# Patient Record
Sex: MALE | Race: WHITE | Employment: FULL TIME | URBAN - METROPOLITAN AREA
[De-identification: names, ages, dates, MRNs, and addresses within clinical notes are randomized per-mention and may not be internally consistent; named-entity substitution may affect disease eponyms.]

---

## 2017-04-18 ENCOUNTER — CLINICAL SUPPORT (OUTPATIENT)
Dept: OPHTHALMOLOGY | Facility: CLINIC | Age: 52
End: 2017-04-18
Payer: COMMERCIAL

## 2017-04-18 ENCOUNTER — OFFICE VISIT (OUTPATIENT)
Dept: OPTOMETRY | Facility: CLINIC | Age: 52
End: 2017-04-18
Payer: COMMERCIAL

## 2017-04-18 DIAGNOSIS — H21.233 PIGMENT DISPERSION SYNDROME, BILATERAL: Primary | ICD-10-CM

## 2017-04-18 DIAGNOSIS — H40.053 OCULAR HYPERTENSION, BILATERAL: ICD-10-CM

## 2017-04-18 PROCEDURE — 92133 CPTRZD OPH DX IMG PST SGM ON: CPT | Mod: S$GLB,,, | Performed by: OPTOMETRIST

## 2017-04-18 PROCEDURE — 92020 GONIOSCOPY: CPT | Mod: S$GLB,,, | Performed by: OPTOMETRIST

## 2017-04-18 PROCEDURE — 92015 DETERMINE REFRACTIVE STATE: CPT | Mod: S$GLB,,, | Performed by: OPTOMETRIST

## 2017-04-18 PROCEDURE — 92014 COMPRE OPH EXAM EST PT 1/>: CPT | Mod: S$GLB,,, | Performed by: OPTOMETRIST

## 2017-04-18 PROCEDURE — 92083 EXTENDED VISUAL FIELD XM: CPT | Mod: S$GLB,,, | Performed by: OPTOMETRIST

## 2017-04-18 PROCEDURE — 99999 PR PBB SHADOW E&M-EST. PATIENT-LVL II: CPT | Mod: PBBFAC,,, | Performed by: OPTOMETRIST

## 2017-04-18 NOTE — PROGRESS NOTES
HPI     DLS: 7/18/2016  Pt states va has decreased all distances. Wear +1.75 otc readers.  Occasional floaters. Denies flashes of light     Xalatan ou qpm        Last edited by Anum Pozo on 4/18/2017  8:19 AM.     ROS     Positive for: Skin, Eyes    Negative for: Constitutional, Gastrointestinal, Neurological,   Genitourinary, Musculoskeletal, HENT, Endocrine, Cardiovascular,   Respiratory, Psychiatric, Allergic/Imm, Heme/Lymph    Last edited by Terence Wong, OD on 4/18/2017  9:37 AM. (History)        Assessment /Plan     For exam results, see Encounter Report.    Pigment dispersion syndrome, bilateral  -     Willingham Visual Field - OU - Extended - Both Eyes  -     Posterior Segment OCT Optic Nerve- Both eyes    Ocular hypertension, bilateral  -     Willingham Visual Field - OU - Extended - Both Eyes  -     Posterior Segment OCT Optic Nerve- Both eyes        1. Pig disp/OHT OU--iop wnl on XAL OU (was on milka ou/alpha OD in past). Mod cupping--see OCT (outside normal OD w thin inf RNFL, borderline OS w thin inf RNFL).  VF today wnl OU.   -fam hx. Pach:599/582.  Angles open by gonio  to SS with gr 3 pig on TM OU  2. Pt happy w otc readers when needed  3. ROGERS--pt to cont w ATs          Plan:    1. Cont XAL qhs OU  2. rtc 3 mos--iop ck.  Next  HVF 24-2 sf/OCT/full April 2018

## 2017-09-29 ENCOUNTER — OFFICE VISIT (OUTPATIENT)
Dept: OPTOMETRY | Facility: CLINIC | Age: 52
End: 2017-09-29
Payer: COMMERCIAL

## 2017-09-29 DIAGNOSIS — H21.233 PIGMENT DISPERSION SYNDROME, BILATERAL: Primary | ICD-10-CM

## 2017-09-29 DIAGNOSIS — H40.053 OCULAR HYPERTENSION, BILATERAL: ICD-10-CM

## 2017-09-29 PROCEDURE — 99999 PR PBB SHADOW E&M-EST. PATIENT-LVL II: CPT | Mod: PBBFAC,,, | Performed by: OPTOMETRIST

## 2017-09-29 PROCEDURE — 92012 INTRM OPH EXAM EST PATIENT: CPT | Mod: S$GLB,,, | Performed by: OPTOMETRIST

## 2017-09-29 RX ORDER — METHOCARBAMOL 750 MG/1
TABLET, FILM COATED ORAL
Refills: 0 | COMMUNITY
Start: 2017-08-01 | End: 2018-05-14

## 2017-09-29 RX ORDER — FLUOCINONIDE 0.5 MG/G
OINTMENT TOPICAL
Refills: 0 | COMMUNITY
Start: 2017-09-09 | End: 2017-09-29 | Stop reason: SDUPTHER

## 2017-09-29 RX ORDER — IBUPROFEN 800 MG/1
TABLET ORAL
Refills: 0 | COMMUNITY
Start: 2017-07-24 | End: 2018-05-14

## 2017-09-29 NOTE — PROGRESS NOTES
HPI     DLS:4/18/2017  Pt states va has decreased all distances. Wear +1.75 otc readers.  Occasional floaters. Denies flashes of light     Xalatan ou qpm     Pigment dispersion syndrome ou   Hx OHT OU --see prev notes    Last edited by Terence Wong, OD on 9/29/2017  8:22 AM. (History)        ROS     Positive for: Eyes (pig disp/OHT OU)    Negative for: Constitutional, Gastrointestinal, Neurological, Skin,   Genitourinary, Musculoskeletal, HENT, Endocrine, Cardiovascular,   Respiratory, Psychiatric, Allergic/Imm, Heme/Lymph    Last edited by Terence Wong, OD on 9/29/2017  8:22 AM. (History)        Assessment /Plan     For exam results, see Encounter Report.    Pigment dispersion syndrome, bilateral    Ocular hypertension, bilateral        1. Pig disp/OHT OU--iop wnl on XAL OU (was on milka ou/alpha OD in past). Mod cupping--see OCT (outside normal OD w thin inf RNFL, borderline OS w thin inf RNFL).  Last VF wnl OU.   -fam hx. Pach:599/582.  Angles open by gonio  to SS with gr 3 pig on TM OU  2. Pt happy w otc readers when needed  3. ROGERS--pt to cont w ATs          Plan:    1. Cont XAL qhs OU  2. rtc 3 mos--iop ck.  Next  HVF 24-2 sf/OCT/full April 2018

## 2018-02-05 ENCOUNTER — OFFICE VISIT (OUTPATIENT)
Dept: OPTOMETRY | Facility: CLINIC | Age: 53
End: 2018-02-05
Payer: COMMERCIAL

## 2018-02-05 DIAGNOSIS — H40.053 OCULAR HYPERTENSION, BILATERAL: ICD-10-CM

## 2018-02-05 DIAGNOSIS — H21.233 PIGMENT DISPERSION SYNDROME, BILATERAL: Primary | ICD-10-CM

## 2018-02-05 PROCEDURE — 92012 INTRM OPH EXAM EST PATIENT: CPT | Mod: S$GLB,,, | Performed by: OPTOMETRIST

## 2018-02-05 PROCEDURE — 99999 PR PBB SHADOW E&M-EST. PATIENT-LVL II: CPT | Mod: PBBFAC,,, | Performed by: OPTOMETRIST

## 2018-02-05 RX ORDER — PANTOPRAZOLE SODIUM 40 MG/1
TABLET, DELAYED RELEASE ORAL
Refills: 0 | COMMUNITY
Start: 2017-12-06 | End: 2018-05-14

## 2018-02-05 RX ORDER — FLUOCINONIDE 0.5 MG/G
OINTMENT TOPICAL
Refills: 3 | COMMUNITY
Start: 2017-11-27

## 2018-02-05 RX ORDER — SODIUM, POTASSIUM,MAG SULFATES 17.5-3.13G
SOLUTION, RECONSTITUTED, ORAL ORAL
Refills: 0 | COMMUNITY
Start: 2017-12-11 | End: 2018-05-14

## 2018-02-05 NOTE — PROGRESS NOTES
HPI     DLS: 9/29/2017  Pt here for IOP Check   Pt states no noticeable va changes    XAL OU QHS     Pig Disp/OHTN OU --pt reports faithful w med  ROGERS     Last edited by Terence Wong, OD on 2/5/2018 10:37 AM. (History)        ROS     Positive for: Eyes (pig disp/OHT OU)    Negative for: Constitutional, Gastrointestinal, Neurological, Skin,   Genitourinary, Musculoskeletal, HENT, Endocrine, Cardiovascular,   Respiratory, Psychiatric, Allergic/Imm, Heme/Lymph    Last edited by Terence Wong, OD on 2/5/2018 10:37 AM. (History)        Assessment /Plan     For exam results, see Encounter Report.    Pigment dispersion syndrome, bilateral    Ocular hypertension, bilateral  -     Willingham Visual Field - OU - Extended - Both Eyes; Future; Expected date: 05/05/2018  -     Posterior Segment OCT Optic Nerve- Both eyes; Future; Expected date: 05/05/2018        1. Pig disp/OHT OU--iop wnl on XAL OU (was on milka ou/alpha OD in past). Mod cupping--see OCT (outside normal OD w thin inf RNFL, borderline OS w thin inf RNFL).  Last VF wnl OU.   -fam hx. Pach:599/582.  Angles open by gonio  to SS with gr 3 pig on TM OU  2. Pt happy w otc readers when needed  3. ROGERS--pt to cont w ATs          Plan:    1. Cont XAL qhs OU  2. rtc 3 mos-- HVF 24-2 sf/OCT/full may 2018

## 2018-05-11 DIAGNOSIS — R06.02 SOB (SHORTNESS OF BREATH): Primary | ICD-10-CM

## 2018-05-14 ENCOUNTER — OFFICE VISIT (OUTPATIENT)
Dept: PULMONOLOGY | Facility: CLINIC | Age: 53
End: 2018-05-14
Payer: COMMERCIAL

## 2018-05-14 ENCOUNTER — HOSPITAL ENCOUNTER (OUTPATIENT)
Dept: RADIOLOGY | Facility: HOSPITAL | Age: 53
Discharge: HOME | End: 2018-05-14
Attending: NURSE PRACTITIONER
Payer: COMMERCIAL

## 2018-05-14 ENCOUNTER — HOSPITAL ENCOUNTER (OUTPATIENT)
Dept: PULMONOLOGY | Facility: CLINIC | Age: 53
Discharge: HOME | End: 2018-05-14
Payer: COMMERCIAL

## 2018-05-14 ENCOUNTER — TELEPHONE (OUTPATIENT)
Dept: PULMONOLOGY | Facility: CLINIC | Age: 53
End: 2018-05-14

## 2018-05-14 VITALS
HEART RATE: 69 BPM | BODY MASS INDEX: 33.83 KG/M2 | SYSTOLIC BLOOD PRESSURE: 127 MMHG | DIASTOLIC BLOOD PRESSURE: 76 MMHG | OXYGEN SATURATION: 98 % | WEIGHT: 236.31 LBS | HEIGHT: 70 IN

## 2018-05-14 VITALS — BODY MASS INDEX: 33.83 KG/M2 | HEIGHT: 70 IN | WEIGHT: 236.31 LBS

## 2018-05-14 DIAGNOSIS — R06.02 SOB (SHORTNESS OF BREATH): ICD-10-CM

## 2018-05-14 DIAGNOSIS — J30.89 ALLERGIC RHINITIS DUE TO OTHER ALLERGIC TRIGGER, UNSPECIFIED SEASONALITY: ICD-10-CM

## 2018-05-14 DIAGNOSIS — I49.3 PVC'S (PREMATURE VENTRICULAR CONTRACTIONS): ICD-10-CM

## 2018-05-14 DIAGNOSIS — R06.02 SOB (SHORTNESS OF BREATH): Primary | ICD-10-CM

## 2018-05-14 DIAGNOSIS — R07.89 CHEST TIGHTNESS: ICD-10-CM

## 2018-05-14 PROCEDURE — 94618 PULMONARY STRESS TESTING: CPT | Mod: S$GLB,,, | Performed by: INTERNAL MEDICINE

## 2018-05-14 PROCEDURE — 71046 X-RAY EXAM CHEST 2 VIEWS: CPT | Mod: TC,FY

## 2018-05-14 PROCEDURE — 3008F BODY MASS INDEX DOCD: CPT | Mod: CPTII,S$GLB,, | Performed by: NURSE PRACTITIONER

## 2018-05-14 PROCEDURE — 99204 OFFICE O/P NEW MOD 45 MIN: CPT | Mod: S$GLB,,, | Performed by: NURSE PRACTITIONER

## 2018-05-14 PROCEDURE — 71046 X-RAY EXAM CHEST 2 VIEWS: CPT | Mod: 26,,, | Performed by: RADIOLOGY

## 2018-05-14 PROCEDURE — 99999 PR PBB SHADOW E&M-EST. PATIENT-LVL III: CPT | Mod: PBBFAC,,, | Performed by: NURSE PRACTITIONER

## 2018-05-14 RX ORDER — METOPROLOL SUCCINATE 25 MG/1
TABLET, EXTENDED RELEASE ORAL
Refills: 0 | COMMUNITY
Start: 2018-05-10

## 2018-05-14 RX ORDER — ATORVASTATIN CALCIUM 10 MG/1
TABLET, FILM COATED ORAL
Refills: 0 | COMMUNITY
Start: 2018-04-19

## 2018-05-14 NOTE — PATIENT INSTRUCTIONS
· Refer to Allergy. History of allergies when younger.   · Continue follow up cardiology.  · Continue with

## 2018-05-14 NOTE — PROGRESS NOTES
Subjective:       Patient ID: Josep Cody Jr. is a 52 y.o. male.    Chief Complaint: SOB    HPI  Josep Cody Jr. is a 52 y.o. male who presents today for evaluation of SOB/chest tightness. His medical hx includes HDL and glaucoma. He primarily receives his care from  but is self refer for pulmonary evaluation. He has report from PFT's obtained at  which states normal pepe lung volumes and DLCO. He has no values from actual testing but is willing to obtain and send to us. His lungs were clear on CXR today. He has no previous respiratory disease. He did have a left sided cardiac cath at  5/7/18 due to abnormal stress echo with frequent PVC and chest discomfort. Cath revealed slight enlargement of left ventricle. EF 55%. He was placed on metroprolol. He states he gets SOB with chest tightness which occurs at rest and at random occasions. He does state he has reflux and is being followed by GI from outside facility. He states his cardiologist and PCP both feel his symptoms are GI related. He does state he has allergies with sinus congestion. He use to see allergy long ago when he was younger. He has no hx of lung cancer. No family hx of lung cancer. He is a never smoker. He works in agriculture. He has no fever, chills, chest pain, LE edema, weight loss, hemoptysis, cough or wheezing.     Review of patient's allergies indicates:   Allergen Reactions    Amoxicillin (bulk)      unknown    Fish oil Swelling     Past Medical History:   Diagnosis Date    Glaucoma     Hypercholesterolemia      Past Surgical History:   Procedure Laterality Date    WISDOM TOOTH EXTRACTION       Family History     Problem Relation (Age of Onset)    Glaucoma Mother        Social History Main Topics    Smoking status: Never Smoker    Smokeless tobacco: Never Used    Alcohol use No    Drug use: No    Sexual activity: Not on file       Review of Systems   Constitutional: Negative for fever, weight loss and appetite change.  "  HENT: Positive for postnasal drip and rhinorrhea. Negative for trouble swallowing and congestion.    Eyes: Negative for redness and itching.   Respiratory: Positive for chest tightness and shortness of breath. Negative for cough, hemoptysis, sputum production, wheezing and use of rescue inhaler.    Cardiovascular: Negative for chest pain and leg swelling.   Musculoskeletal: Negative for gait problem.   Gastrointestinal: Negative for acid reflux.   Neurological: Negative for headaches.   Hematological: Negative for adenopathy.   Psychiatric/Behavioral: Negative for confusion.       Objective:       Vitals:    05/14/18 1407   BP: 127/76   Pulse: 69   SpO2: 98%   Weight: 107.2 kg (236 lb 5.3 oz)   Height: 5' 10" (1.778 m)     Physical Exam   Constitutional: He is oriented to person, place, and time. He appears well-developed and well-nourished. He is obese.   HENT:   Head: Normocephalic.   Mouth/Throat: Oropharynx is clear and moist.   Neck: Normal range of motion. Neck supple.   Cardiovascular: Normal rate and intact distal pulses.    Pulmonary/Chest: Normal expansion, symmetric chest wall expansion, effort normal and breath sounds normal. No respiratory distress. He has no wheezes. He has no rhonchi. He has no rales.   Abdominal: Soft. Bowel sounds are normal. There is no guarding.   Musculoskeletal: Normal range of motion. He exhibits no edema.   Neurological: He is alert and oriented to person, place, and time. Gait normal.   Skin: Skin is warm and dry. No rash noted.   Psychiatric: He has a normal mood and affect.   Vitals reviewed.    Personal Diagnostic Review   PFT report from  5/2/18: Normal pepe, lung volumes normal, normal DLCO. Patient will obtain report with values.  CXR reviewed 5/14/18: lungs clear   6mwt reviewed 5/14/18: no desaturation noted during activity. Ambulated 1100ft without stopping/difficulty.   Outside report for cardiac cath reviewed   Outside report for stress test " reviewed  Assessment:          Outpatient Encounter Prescriptions as of 5/14/2018   Medication Sig Dispense Refill    aspirin 81 MG Chew Take 81 mg by mouth once daily.      fluocinonide (LIDEX) 0.05 % ointment SIVA SPARINGLY AA OF HANDS AND BODY BID PRF REDNESS AND SCALING  3    ibuprofen (ADVIL,MOTRIN) 800 MG tablet TK 1 T PO Q 8 H FOR 7 DAYS  0    latanoprost 0.005 % ophthalmic solution INSTILL 1 DROP IN BOTH EYES EVERY EVENING 10 mL 12    methocarbamol (ROBAXIN) 750 MG Tab TK 2 TS PO UP TO QID FOR 4 DAYS PRF BACK PAIN  0    MULTIVIT-IRON-MIN-FOLIC ACID 3,500-18-0.4 UNIT-MG-MG ORAL CHEW Take by mouth once daily.      pantoprazole (PROTONIX) 40 MG tablet TK 1 T PO QD  0    SUPREP BOWEL PREP KIT 17.5-3.13-1.6 gram SolR UTD  0     No facility-administered encounter medications on file as of 5/14/2018.      Problem List Items Addressed This Visit        Other    SOB (shortness of breath) - Primary      Other Visit Diagnoses     PVC's (premature ventricular contractions) noted on outside facility Cath report        Relevant Medications    metoprolol succinate (TOPROL-XL) 25 MG 24 hr tablet    Chest tightness        Allergic rhinitis due to other allergic trigger, unspecified seasonality        Relevant Orders    Ambulatory Referral to Allergy        Plan:       · While patient did not bring values, given normal lung function on report and normal CXR, feel patient's symptoms are not pulmonary related. Curious as to if patient experiencing esoph spasms.   · Referral to Allergy.  · Follow up with GI. May consider upper GI if indicated.    Follow up with PCP  Contact us if any issues or concerns should arise.   Patient verbalized understanding of all information, instruction, education, recommendations provided and agrees with plan of care.

## 2018-05-14 NOTE — PROCEDURES
Josep Cody Jr. is a 52 y.o.  male patient, who presents for a 6 minute walk test ordered by NATE Mondragon.  The diagnosis is Shortness of Breath.  The patient's BMI is 34 kg/m2.  Predicted distance (lower limit of normal) is 449.26 meters.      Test Results:    The test was completed without stopping.   The total time walked was 360 seconds.  During walking, the patient reported:  Lightheadedness. The patient used    during testing.     05/14/2018---------Distance: 487.68 meters (1600 feet)     O2 Sat % Supplemental Oxygen Heart Rate Blood Pressure Angela Scale   Pre-exercise  (Resting) 98 % Room Air 69 bpm 127/76 mmHg 0   During Exercise 98 % Room Air 79 bpm 137/72 mmHg 0.5   Post-exercise  (Recovery) 99 % Room Air  69 bpm   mmHg       Recovery Time: 113 seconds    Performing nurse/tech: MELVIN Paz      PREVIOUS STUDY:   The patient has not had a previous study.      CLINICAL INTERPRETATION:  Six minute walk distance is 487.68 meters (1600 feet) with very, very light dyspnea.  During exercise, there was no significant desaturation while breathing room air.  Both blood pressure and heart rate remained stable with walking.  The patient reported non-pulmonary symptoms during exercise.  No previous study performed.  Based upon age and body mass index, exercise capacity is normal.

## 2018-05-14 NOTE — TELEPHONE ENCOUNTER
Called patient back and patient stated he does not think he has anxiety. Informed patient that I was not dx him with anxiety and that it was just a thought due to no evidence pulmonary disease with regards to SOB/chest tightness at random times. He has normal PFT's and radiographs. Patient was satisfied with encounter.

## 2018-05-14 NOTE — TELEPHONE ENCOUNTER
----- Message from Marychuy León sent at 5/14/2018  3:29 PM CDT -----  Contact: Pt   [phone]  Needs Medical Advice    Who Called: Pt    Symptoms (please be specific):  N/a   How long has patient had these symptoms:  N/a   Pharmacy name and phone # if needed:  N/a  Communication Preference (MyChart response to Pt. (or) Call Back # and timeframe):  Additional Information: Pt requesting to speak with the nurse , no additional information was provided

## 2018-05-16 ENCOUNTER — CLINICAL SUPPORT (OUTPATIENT)
Dept: OPHTHALMOLOGY | Facility: CLINIC | Age: 53
End: 2018-05-16
Payer: COMMERCIAL

## 2018-05-16 ENCOUNTER — OFFICE VISIT (OUTPATIENT)
Dept: OPTOMETRY | Facility: CLINIC | Age: 53
End: 2018-05-16
Payer: COMMERCIAL

## 2018-05-16 ENCOUNTER — TELEPHONE (OUTPATIENT)
Dept: PULMONOLOGY | Facility: CLINIC | Age: 53
End: 2018-05-16

## 2018-05-16 DIAGNOSIS — H40.053 OCULAR HYPERTENSION, BILATERAL: ICD-10-CM

## 2018-05-16 DIAGNOSIS — H21.233 PIGMENT DISPERSION SYNDROME, BILATERAL: Primary | ICD-10-CM

## 2018-05-16 PROCEDURE — 92015 DETERMINE REFRACTIVE STATE: CPT | Mod: S$GLB,,, | Performed by: OPTOMETRIST

## 2018-05-16 PROCEDURE — 92133 CPTRZD OPH DX IMG PST SGM ON: CPT | Mod: S$GLB,,, | Performed by: OPTOMETRIST

## 2018-05-16 PROCEDURE — 92014 COMPRE OPH EXAM EST PT 1/>: CPT | Mod: S$GLB,,, | Performed by: OPTOMETRIST

## 2018-05-16 PROCEDURE — 99999 PR PBB SHADOW E&M-EST. PATIENT-LVL II: CPT | Mod: PBBFAC,,, | Performed by: OPTOMETRIST

## 2018-05-16 PROCEDURE — 92020 GONIOSCOPY: CPT | Mod: S$GLB,,, | Performed by: OPTOMETRIST

## 2018-05-16 PROCEDURE — 92083 EXTENDED VISUAL FIELD XM: CPT | Mod: S$GLB,,, | Performed by: OPTOMETRIST

## 2018-05-16 RX ORDER — PRENATAL VIT 91/IRON/FOLIC/DHA 28-975-200
COMBINATION PACKAGE (EA) ORAL
COMMUNITY

## 2018-05-16 NOTE — TELEPHONE ENCOUNTER
----- Message from Marychuy León sent at 5/16/2018 12:33 PM CDT -----  Patient Returning Call    Who Called:  Pt    Who Left Message for Patient:  Nurse   Does the patient know what this is regarding?:    Communication Preference (Rocael response to Pt. (or) Call Back # and timeframe)  Additional Information: pt returning the nurse call

## 2018-05-16 NOTE — PROGRESS NOTES
HPI     DLS: 2/5/2018  HVF/OCT done today  Pt states no noticeable va changes  Denies f/f    XAL OU QHS     Pig Disp/OHTN OU --pt reports faithful w med  ROGERS     Last edited by Anum Pozo on 5/16/2018  1:43 PM. (History)        ROS     Positive for: Eyes (pig disp/OHT OU)    Negative for: Constitutional, Gastrointestinal, Neurological, Skin,   Genitourinary, Musculoskeletal, HENT, Endocrine, Cardiovascular,   Respiratory, Psychiatric, Allergic/Imm, Heme/Lymph    Last edited by Terence Wong, OD on 5/16/2018  2:34 PM. (History)        Assessment /Plan     For exam results, see Encounter Report.    Pigment dispersion syndrome, bilateral    Ocular hypertension, bilateral  -     Willingham Visual Field - OU - Extended - Both Eyes  -     Posterior Segment OCT Optic Nerve- Both eyes        1. Pig disp/OHT OU--iop wnl on XAL OU (was on milka ou/alpha OD in past). Mod cupping--see OCT (outside normal OD w red sup/Yellow inf, borderline OS w yellow N/I).   VF today wnl OU.   -fam hx. Pach:599/582.  Angles open by gonio  to SS with gr 3 pig on TM OU  2. Pt happy w otc readers when needed  3. ROGERS--pt to cont w ATs          Plan:    1. Cont XAL qhs OU  2. rtc 4 mos--iop ck.  Next HVF 24-2 sf/OCT/full may 2019

## 2018-05-18 ENCOUNTER — OFFICE VISIT (OUTPATIENT)
Dept: ALLERGY | Facility: CLINIC | Age: 53
End: 2018-05-18
Payer: COMMERCIAL

## 2018-05-18 VITALS — OXYGEN SATURATION: 99 % | BODY MASS INDEX: 35.27 KG/M2 | HEIGHT: 69 IN | WEIGHT: 238.13 LBS | HEART RATE: 66 BPM

## 2018-05-18 DIAGNOSIS — R06.02 SOB (SHORTNESS OF BREATH): ICD-10-CM

## 2018-05-18 DIAGNOSIS — J31.0 CHRONIC RHINITIS: ICD-10-CM

## 2018-05-18 DIAGNOSIS — R07.89 CHEST TIGHTNESS: Primary | ICD-10-CM

## 2018-05-18 PROCEDURE — 99204 OFFICE O/P NEW MOD 45 MIN: CPT | Mod: S$GLB,,, | Performed by: ALLERGY & IMMUNOLOGY

## 2018-05-18 PROCEDURE — 3008F BODY MASS INDEX DOCD: CPT | Mod: CPTII,S$GLB,, | Performed by: ALLERGY & IMMUNOLOGY

## 2018-05-18 PROCEDURE — 99999 PR PBB SHADOW E&M-EST. PATIENT-LVL III: CPT | Mod: PBBFAC,,, | Performed by: ALLERGY & IMMUNOLOGY

## 2018-05-18 RX ORDER — CETIRIZINE HYDROCHLORIDE 10 MG/1
10 TABLET ORAL DAILY
COMMUNITY

## 2018-05-18 NOTE — PROGRESS NOTES
Subjective:       Patient ID: Josep Cody Jr. is a 52 y.o. male.    Chief Complaint:  Allergies (pulmonologist referred for allergy - environmental, )      53 yo man presents for new patient evaluation of possible allergies. He states he works for [employer]. 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 Trees were blooming and weeds were very high. He got SOB there. Took several days to clear but did. Then 1 month ago about 4/16 started and has been most day since. 417 went to ER because chest tight, SOB and even pain in jaw. He had cardiac workup negative, CXR normal. He saw cardiologist and had angiogram which showed some enlarged heart and irreg hear beat so is on metoprolol now. He saw Gi and not much found. Saw pulmonary and normal PFT and normal walk test. CXR normal. Did not want to get inhaler because of irreg heart rate. Advised to eval for allergies. He still gets episodes of tightness and SOB. Often occurs in AM. Often occurs at rest. sometimes going outside helps. Lasts for hours. In AM he has some lear phlegm and gets some after eating. At times stuffy and runny nose and uses Flonase prn. He has no prior asthma or eczema. He does have allergy to fish  Causes throat swelling but fine with shellfish. He had reaction with throat swelling after creole tomatoes sandwich 2 years ago so avoids tomatoes now. He has reactions to amoxil dn fish oil. Fine with shellfish.         Environmental History: see history section for home environment  Review of Systems   Constitutional: Negative for activity change, appetite change, chills, fatigue, fever and unexpected weight change.   HENT: Positive for congestion, postnasal drip and rhinorrhea. Negative for ear discharge, ear pain, facial swelling, hearing loss, mouth sores, nosebleeds, sinus pressure, sneezing, sore throat, tinnitus, trouble swallowing and voice change.    Eyes: Negative for discharge, redness,  itching and visual disturbance.   Respiratory: Positive for chest tightness and shortness of breath. Negative for cough and wheezing.    Cardiovascular: Negative for chest pain, palpitations and leg swelling.   Gastrointestinal: Negative for abdominal distention, abdominal pain, constipation, diarrhea, nausea and vomiting.   Genitourinary: Negative for difficulty urinating.   Musculoskeletal: Negative for arthralgias, back pain, joint swelling and myalgias.   Skin: Negative for color change, pallor and rash.   Neurological: Negative for dizziness, tremors, speech difficulty, weakness, light-headedness and headaches.   Hematological: Negative for adenopathy. Does not bruise/bleed easily.   Psychiatric/Behavioral: Negative for agitation, confusion, decreased concentration and sleep disturbance. The patient is not nervous/anxious.         Objective:    Physical Exam   Constitutional: He is oriented to person, place, and time. He appears well-developed and well-nourished. No distress.   HENT:   Head: Normocephalic and atraumatic.   Right Ear: Hearing, tympanic membrane, external ear and ear canal normal.   Left Ear: Hearing, tympanic membrane, external ear and ear canal normal.   Nose: No mucosal edema (pink turbinates), rhinorrhea, sinus tenderness or septal deviation. No epistaxis.   Mouth/Throat: Oropharynx is clear and moist and mucous membranes are normal. No uvula swelling.   Eyes: Conjunctivae are normal. Right eye exhibits no discharge. Left eye exhibits no discharge.   Neck: Normal range of motion. No thyromegaly present.   Cardiovascular: Normal rate, regular rhythm and normal heart sounds.    No murmur heard.  Pulmonary/Chest: Effort normal and breath sounds normal. No respiratory distress. He has no wheezes.   Abdominal: Soft. He exhibits no distension. There is no tenderness.   Musculoskeletal: Normal range of motion. He exhibits no edema.   Lymphadenopathy:     He has no cervical adenopathy.    Neurological: He is alert and oriented to person, place, and time. Coordination normal.   Skin: Skin is warm and dry. No rash noted. No erythema.   Psychiatric: He has a normal mood and affect. His behavior is normal. Judgment and thought content normal.   Nursing note and vitals reviewed.      Laboratory:   Percutaneous Skin Testing: prick skin test inhalants and tomato, #61, 5/18/18: 3+ histamine control and remainder all negative, see flow sheet  Assessment:       1. Chest tightness    2. SOB (shortness of breath)    3. Chronic rhinitis         Plan:       1. No evidence IgE mediated allergy. symptoms more c/w LPR or esophageal spasms, keep appointment for upper GI

## 2018-05-21 ENCOUNTER — TELEPHONE (OUTPATIENT)
Dept: ALLERGY | Facility: CLINIC | Age: 53
End: 2018-05-21

## 2018-05-21 NOTE — TELEPHONE ENCOUNTER
Spoke to pt, he states that about 30 minutes after he left the office he had reactions to skin tests.     I explained that Dr Schwartz may advise Blood tests to confirm and I told him that I would discuss it with her and call him back.      Pt had questions about if his insurance would cover additional testing, Gave pt the Pricing [employer] Contact information.

## 2018-05-21 NOTE — TELEPHONE ENCOUNTER
----- Message from Balbina Diaz MA sent at 5/21/2018  8:19 AM CDT -----  Contact: Self/[phone]  Pt stated that he may have had a delayed reaction to his skin prick test on 5/18/2018. He stated that his back started to break out in whelps/hives 30 minutes after leaving the office. He stated that he is no longer having the reaction but would like to speak with a nurse to make note of it. Please advise.    Thanks

## 2018-05-22 NOTE — TELEPHONE ENCOUNTER
Spoke to pt on 5/21/18 and told him I would call him back on the afternoon of 5/23/18 because he had procedures scheduled 5/22/18 and would not be available. Reminder set in EPIC.

## 2018-05-22 NOTE — TELEPHONE ENCOUNTER
Please let him know delayed reactions are not significant for inhalant allergies. It is usually contact just to the liquid itself and not true allergy to the specific allergen

## 2018-05-23 ENCOUNTER — TELEPHONE (OUTPATIENT)
Dept: PULMONOLOGY | Facility: CLINIC | Age: 53
End: 2018-05-23

## 2018-05-23 NOTE — TELEPHONE ENCOUNTER
Patient saw GI as recommended. He had an EGD. Someone later told him he should have had an esophogram instead. GI told him he needs to see a Pulmonologist. He has an appointment next week with Dr. Pringle. Patient still having issues with SOB and chest tightness and asks that you call him to discuss.

## 2018-05-23 NOTE — TELEPHONE ENCOUNTER
----- Message from Michelle Cantu sent at 5/23/2018  9:49 AM CDT -----  Contact: Pt  Pt is requesting a callback regarding his test results says he was told he should have had an esophagram also    Pt can be reached at [phone]    Thanks

## 2018-05-23 NOTE — TELEPHONE ENCOUNTER
Called patient back and advised to speak to GI MD regarding esophagram. Also advised to keep appt with Dr. Pringle. Patient verbalized understanding and was satisfied with encounter.

## 2018-06-01 ENCOUNTER — OFFICE VISIT (OUTPATIENT)
Dept: PULMONOLOGY | Facility: CLINIC | Age: 53
End: 2018-06-01
Payer: COMMERCIAL

## 2018-06-01 VITALS
BODY MASS INDEX: 35.43 KG/M2 | SYSTOLIC BLOOD PRESSURE: 124 MMHG | HEIGHT: 69 IN | HEART RATE: 72 BPM | OXYGEN SATURATION: 97 % | DIASTOLIC BLOOD PRESSURE: 72 MMHG | WEIGHT: 239.19 LBS

## 2018-06-01 DIAGNOSIS — R06.02 SOB (SHORTNESS OF BREATH): Primary | ICD-10-CM

## 2018-06-01 PROCEDURE — 99999 PR PBB SHADOW E&M-EST. PATIENT-LVL III: CPT | Mod: PBBFAC,,, | Performed by: INTERNAL MEDICINE

## 2018-06-01 PROCEDURE — 99214 OFFICE O/P EST MOD 30 MIN: CPT | Mod: S$GLB,,, | Performed by: INTERNAL MEDICINE

## 2018-06-01 PROCEDURE — 3008F BODY MASS INDEX DOCD: CPT | Mod: CPTII,S$GLB,, | Performed by: INTERNAL MEDICINE

## 2018-06-04 NOTE — PROGRESS NOTES
HISTORY OF PRESENT ILLNESS:  Mr. Cody is a 52-year-old nonsmoker who comes for   help with recent thoracic symptoms.  He gives a somewhat animated history of   shortness of breath, tightness in the chest, and atypical anterior thoracic   pain.  His initial respiratory problems occurred approximately 2-3 months   ago.  At that time, he was inspecting fruit trees and walking through tall   weeds.  He felt short of breath.  This difficulty persisted for a day or so, and   then seemed to resolve.  Subsequently, he has had recurrent problems with the   feeling of chest tightness along with intermittent shortness of breath.  His   respiratory symptoms have been both exertional and nonexertional.  His thoracic   pain does not have features to suggest pleurisy or angina.  He does not know of   any proven history for asthma or allergic diseases.    Mr. Cody has had multiple evaluations here and at [facility] during the past two months.  He has undergone studies with a   cardiologist, a gastroenterologist, and an allergist.  He also was seen by the   nurse practitioner in this clinic.  Studies done by those physicians have not   yielded a specific diagnosis for his symptoms.  He does report that a recent   upper GI x-ray showed evidence for gastroesophageal reflux and a hiatal hernia.    He was recommended to begin a trial of treatment with omeprazole.  He also was   found to have an irregular heartbeat and has been prescribed metoprolol.  The   respiratory symptoms preceded beginning metoprolol.    Mr. Cody is a lifelong nonsmoker.  His family history is of note in that there   are members who have allergic disease.  He also knows of family members who   have chronic obstructive lung disease.  These individuals have all been   long-term smokers.    DATA:  I have reviewed the images from a chest x-ray done earlier this month.    The cardiac silhouette is not enlarged.  There are no pleural  abnormalities.    The lungs appear clear.  There are no previous radiographs available for   comparison.    Pulmonary function studies done in May at [facility] show   normal values for spirometry, lung volumes, and the diffusion capacity.      KINGSTON/MAYCO  dd: 06/02/2018 09:13:13 (CDT)  td: 06/03/2018 00:03:56 (CDT)  Doc ID   #[id number]  Job ID #[id number]    CC:

## 2018-06-13 ENCOUNTER — TELEPHONE (OUTPATIENT)
Dept: PULMONOLOGY | Facility: CLINIC | Age: 53
End: 2018-06-13

## 2018-06-13 RX ORDER — MONTELUKAST SODIUM 10 MG/1
10 TABLET ORAL NIGHTLY
Refills: 4 | COMMUNITY
Start: 2018-06-04

## 2018-06-13 RX ORDER — CLOBETASOL PROPIONATE 0.5 MG/G
OINTMENT TOPICAL
Refills: 1 | COMMUNITY
Start: 2018-05-31

## 2018-09-28 ENCOUNTER — OFFICE VISIT (OUTPATIENT)
Dept: OPTOMETRY | Facility: CLINIC | Age: 53
End: 2018-09-28
Payer: COMMERCIAL

## 2018-09-28 DIAGNOSIS — H21.233 PIGMENT DISPERSION SYNDROME, BILATERAL: ICD-10-CM

## 2018-09-28 DIAGNOSIS — H40.053 OCULAR HYPERTENSION, BILATERAL: Primary | ICD-10-CM

## 2018-09-28 PROCEDURE — 92012 INTRM OPH EXAM EST PATIENT: CPT | Mod: S$GLB,,, | Performed by: OPTOMETRIST

## 2018-09-28 PROCEDURE — 99999 PR PBB SHADOW E&M-EST. PATIENT-LVL II: CPT | Mod: PBBFAC,,, | Performed by: OPTOMETRIST

## 2018-09-28 RX ORDER — IPRATROPIUM BROMIDE 42 UG/1
2 SPRAY, METERED NASAL 4 TIMES DAILY
COMMUNITY

## 2018-09-28 RX ORDER — OMEPRAZOLE 20 MG/1
20 CAPSULE, DELAYED RELEASE ORAL DAILY
COMMUNITY

## 2018-09-28 RX ORDER — ALBUTEROL SULFATE 90 UG/1
2 AEROSOL, METERED RESPIRATORY (INHALATION) EVERY 6 HOURS PRN
COMMUNITY

## 2018-09-28 NOTE — PROGRESS NOTES
HPI     DLS: 5/16/2018  Pt states within the last two months and with using drops near va is not   great. Wear otc +1.75 readers.  Denies f/f    XAL OU QHS     Pig Disp/OHTN OU --pt reports is faithful w med  ROGERS     Last edited by Terence Wong, OD on 9/28/2018  9:16 AM. (History)        ROS     Positive for: Eyes (pig disp/OHT OU)    Negative for: Constitutional, Gastrointestinal, Neurological, Skin,   Genitourinary, Musculoskeletal, HENT, Endocrine, Cardiovascular,   Respiratory, Psychiatric, Allergic/Imm, Heme/Lymph    Last edited by Terence Wong, OD on 9/28/2018  9:16 AM. (History)        Assessment /Plan     For exam results, see Encounter Report.    Ocular hypertension, bilateral    Pigment dispersion syndrome, bilateral          1. Pig disp/OHT OU--iop wnl on XAL OU (was on milka ou/alpha OD in past). Mod cupping--see OCT (outside normal OD w red sup/Yellow inf, borderline OS w yellow N/I).   Last VF wnl OU.   -fam hx. Pach:599/582.  Angles open by gonio  to SS with gr 3 pig on TM OU  2. Pt happy w otc readers when needed--discussed it is NORMAL to need them more as one gets older and more presbyopic  3. ROGERS--pt to cont w ATs          Plan:    1. Cont XAL qhs OU  2. rtc 4 mos--iop ck.  Next HVF 24-2 sf/OCT/full may 2019

## 2019-01-23 ENCOUNTER — TELEPHONE (OUTPATIENT)
Dept: OPTOMETRY | Facility: CLINIC | Age: 54
End: 2019-01-23

## 2019-02-15 ENCOUNTER — OFFICE VISIT (OUTPATIENT)
Dept: OPTOMETRY | Facility: CLINIC | Age: 54
End: 2019-02-15
Payer: COMMERCIAL

## 2019-02-15 DIAGNOSIS — H21.233 PIGMENT DISPERSION SYNDROME, BILATERAL: ICD-10-CM

## 2019-02-15 DIAGNOSIS — H40.053 OCULAR HYPERTENSION, BILATERAL: Primary | ICD-10-CM

## 2019-02-15 PROCEDURE — 99999 PR PBB SHADOW E&M-EST. PATIENT-LVL II: CPT | Mod: PBBFAC,,, | Performed by: OPTOMETRIST

## 2019-02-15 PROCEDURE — 99999 PR PBB SHADOW E&M-EST. PATIENT-LVL II: ICD-10-PCS | Mod: PBBFAC,,, | Performed by: OPTOMETRIST

## 2019-02-15 PROCEDURE — 92012 PR EYE EXAM, EST PATIENT,INTERMED: ICD-10-PCS | Mod: S$GLB,,, | Performed by: OPTOMETRIST

## 2019-02-15 PROCEDURE — 92012 INTRM OPH EXAM EST PATIENT: CPT | Mod: S$GLB,,, | Performed by: OPTOMETRIST

## 2019-02-15 NOTE — PROGRESS NOTES
HPI     Using Xal drops qhs OU--pt reports faithful w meds.  No problems today    Last edited by Terence Wong, OD on 2/15/2019  9:11 AM. (History)        ROS     Positive for: Eyes (pig disp/OHT OU)    Negative for: Constitutional, Gastrointestinal, Neurological, Skin,   Genitourinary, Musculoskeletal, HENT, Endocrine, Cardiovascular,   Respiratory, Psychiatric, Allergic/Imm, Heme/Lymph    Last edited by Terence Wong, OD on 2/15/2019  9:11 AM. (History)        Assessment /Plan     For exam results, see Encounter Report.    Ocular hypertension, bilateral  -     Willingham Visual Field - OU - Extended - Both Eyes; Future  -     Posterior Segment OCT Optic Nerve- Both eyes; Future    Pigment dispersion syndrome, bilateral            1. Pig disp/OHT OU--iop wnl on XAL OU (was on milka ou/alpha OD in past). Mod cupping--see OCT (outside normal OD w red sup/Yellow inf, borderline OS w yellow N/I).   Last VF wnl OU.   -fam hx. Pach:599/582.  Angles open by gonio  to SS with gr 3 pig on TM OU  2. Pt happy w otc readers when needed  3. ROGERS--pt to cont w ATs          Plan:    1. Cont XAL qhs OU  2. rtc 4 mos--HVF 24-2 sf/OCT/full June 2019

## 2019-06-24 ENCOUNTER — CLINICAL SUPPORT (OUTPATIENT)
Dept: OPHTHALMOLOGY | Facility: CLINIC | Age: 54
End: 2019-06-24
Payer: COMMERCIAL

## 2019-06-24 ENCOUNTER — OFFICE VISIT (OUTPATIENT)
Dept: OPTOMETRY | Facility: CLINIC | Age: 54
End: 2019-06-24
Payer: COMMERCIAL

## 2019-06-24 DIAGNOSIS — H21.233 PIGMENT DISPERSION SYNDROME, BILATERAL: ICD-10-CM

## 2019-06-24 DIAGNOSIS — H40.053 OCULAR HYPERTENSION, BILATERAL: ICD-10-CM

## 2019-06-24 DIAGNOSIS — H40.053 OCULAR HYPERTENSION, BILATERAL: Primary | ICD-10-CM

## 2019-06-24 PROCEDURE — 92133 POSTERIOR SEGMENT OCT OPTIC NERVE(OCULAR COHERENCE TOMOGRAPHY) - OU - BOTH EYES: ICD-10-PCS | Mod: S$GLB,,, | Performed by: OPTOMETRIST

## 2019-06-24 PROCEDURE — 92014 PR EYE EXAM, EST PATIENT,COMPREHESV: ICD-10-PCS | Mod: S$GLB,,, | Performed by: OPTOMETRIST

## 2019-06-24 PROCEDURE — 92020 PR SPECIAL EYE EVAL,GONIOSCOPY: ICD-10-PCS | Mod: S$GLB,,, | Performed by: OPTOMETRIST

## 2019-06-24 PROCEDURE — 92020 GONIOSCOPY: CPT | Mod: S$GLB,,, | Performed by: OPTOMETRIST

## 2019-06-24 PROCEDURE — 92083 HUMPHREY VISUAL FIELD - OU - BOTH EYES: ICD-10-PCS | Mod: S$GLB,,, | Performed by: OPTOMETRIST

## 2019-06-24 PROCEDURE — 99999 PR PBB SHADOW E&M-EST. PATIENT-LVL II: ICD-10-PCS | Mod: PBBFAC,,, | Performed by: OPTOMETRIST

## 2019-06-24 PROCEDURE — 92015 PR REFRACTION: ICD-10-PCS | Mod: S$GLB,,, | Performed by: OPTOMETRIST

## 2019-06-24 PROCEDURE — 99999 PR PBB SHADOW E&M-EST. PATIENT-LVL II: CPT | Mod: PBBFAC,,, | Performed by: OPTOMETRIST

## 2019-06-24 PROCEDURE — 92133 CPTRZD OPH DX IMG PST SGM ON: CPT | Mod: S$GLB,,, | Performed by: OPTOMETRIST

## 2019-06-24 PROCEDURE — 92015 DETERMINE REFRACTIVE STATE: CPT | Mod: S$GLB,,, | Performed by: OPTOMETRIST

## 2019-06-24 PROCEDURE — 92083 EXTENDED VISUAL FIELD XM: CPT | Mod: S$GLB,,, | Performed by: OPTOMETRIST

## 2019-06-24 PROCEDURE — 92014 COMPRE OPH EXAM EST PT 1/>: CPT | Mod: S$GLB,,, | Performed by: OPTOMETRIST

## 2019-06-24 NOTE — PROGRESS NOTES
HPI     DLS; 2/15/19  Pt states no VA problems did go to main [facility] today to get HVF and OCT   done. States when doing the HVF test that his right eye VA seemed darker   when they covered up his left eye.   No f/f  Xal gtts --pt faithful w drops    Last edited by Terence Wong, OD on 6/24/2019  1:28 PM. (History)        ROS     Positive for: Eyes (pig disp/OHT OU)    Negative for: Constitutional, Gastrointestinal, Neurological, Skin,   Genitourinary, Musculoskeletal, HENT, Endocrine, Cardiovascular,   Respiratory, Psychiatric, Allergic/Imm, Heme/Lymph    Last edited by Terence Wong, OD on 6/24/2019  1:28 PM. (History)        Assessment /Plan     For exam results, see Encounter Report.    Ocular hypertension, bilateral    Pigment dispersion syndrome, bilateral      1. Pig disp/OHT OU--iop wnl on XAL OU (was on milka ou/alpha OD in past). Mod cupping--see OCT (outside normal OD w red sup/Yellow inf, borderline OS w yellow N/I).   VF today +/- wnl OU 9reliability).   -fam hx. Pach:599/582.  Angles open by gonio  to SS with gr 3 pig on TM OU  2. Pt happy w otc readers when needed  3. ROGERS--pt to cont w ATs          Plan:    1. Cont XAL qhs OU  2. rtc 6 mos--iop ck.  Next HVF 24-2 sf/OCT/full June 2020

## 2019-12-02 ENCOUNTER — OFFICE VISIT (OUTPATIENT)
Dept: OPTOMETRY | Facility: CLINIC | Age: 54
End: 2019-12-02
Payer: COMMERCIAL

## 2019-12-02 DIAGNOSIS — H40.053 OCULAR HYPERTENSION, BILATERAL: Primary | ICD-10-CM

## 2019-12-02 DIAGNOSIS — H21.233 PIGMENT DISPERSION SYNDROME, BILATERAL: ICD-10-CM

## 2019-12-02 PROCEDURE — 99999 PR PBB SHADOW E&M-EST. PATIENT-LVL II: CPT | Mod: PBBFAC,,, | Performed by: OPTOMETRIST

## 2019-12-02 PROCEDURE — 99999 PR PBB SHADOW E&M-EST. PATIENT-LVL II: ICD-10-PCS | Mod: PBBFAC,,, | Performed by: OPTOMETRIST

## 2019-12-02 PROCEDURE — 92012 PR EYE EXAM, EST PATIENT,INTERMED: ICD-10-PCS | Mod: S$GLB,,, | Performed by: OPTOMETRIST

## 2019-12-02 PROCEDURE — 92012 INTRM OPH EXAM EST PATIENT: CPT | Mod: S$GLB,,, | Performed by: OPTOMETRIST

## 2019-12-02 RX ORDER — ESCITALOPRAM OXALATE 10 MG/1
10 TABLET ORAL DAILY
Refills: 1 | COMMUNITY
Start: 2019-11-16

## 2019-12-02 NOTE — PROGRESS NOTES
HPI     DLS:6/24/19  Pt states no VA problems here for his 6 month IOP check   No f/f  Latanoprost gtts     Last edited by Alia Little MA on 12/2/2019 12:57 PM. (History)            Assessment /Plan     For exam results, see Encounter Report.    Ocular hypertension, bilateral  -     Willingham Visual Field - OU - Extended - Both Eyes; Future; Expected date: 06/02/2020  -     Posterior Segment OCT Optic Nerve- Both eyes; Future; Expected date: 06/02/2020    Pigment dispersion syndrome, bilateral  -     Willingham Visual Field - OU - Extended - Both Eyes; Future; Expected date: 06/02/2020  -     Posterior Segment OCT Optic Nerve- Both eyes; Future; Expected date: 06/02/2020      1. Pig disp/OHT OU--iop wnl on XAL OU (was on milka ou/alpha OD in past). Mod cupping--see OCT (outside normal OD w red sup/Yellow inf, borderline OS w yellow N/I).   Last VF +/- wnl OU (reliability).   -fam hx. Pach:599/582.  Angles open by gonio  to SS with gr 3 pig on TM OU  2. Pt happy w otc readers when needed  3. ROGERS--pt to cont w ATs          Plan:    1. Cont XAL qhs OU  2. rtc 6 mos-- HVF 24-2 sf/OCT/full June 2020

## 2020-01-06 ENCOUNTER — TELEPHONE (OUTPATIENT)
Dept: OPTOMETRY | Facility: CLINIC | Age: 55
End: 2020-01-06

## 2020-01-06 RX ORDER — LATANOPROST 50 UG/ML
SOLUTION/ DROPS OPHTHALMIC
Qty: 10 ML | Refills: 12 | Status: SHIPPED | OUTPATIENT
Start: 2020-01-06 | End: 2020-01-06 | Stop reason: SDUPTHER

## 2020-01-06 RX ORDER — LATANOPROST 50 UG/ML
SOLUTION/ DROPS OPHTHALMIC
Qty: 10 ML | Refills: 12 | Status: SHIPPED | OUTPATIENT
Start: 2020-01-06 | End: 2022-05-02 | Stop reason: SDUPTHER

## 2020-01-06 NOTE — TELEPHONE ENCOUNTER
----- Message from Alia Little MA sent at 1/6/2020 10:54 AM CST -----  Contact:      Pt  [phone]      ----- Message -----  From: Marychuy León  Sent: 1/6/2020  10:51 AM CST  To: [facility] Staff    Rx Refill/Request     Is this a Refill or New Rx:   Refill    Rx Name and Strength:    latanoprost 0.005 % ophthalmic solution  Preferred Pharmacy with phone number:   [facility] [phone]  Communication Preference:   Phone   Additional Information:

## 2020-07-16 ENCOUNTER — OFFICE VISIT (OUTPATIENT)
Dept: OPTOMETRY | Facility: CLINIC | Age: 55
End: 2020-07-16
Payer: COMMERCIAL

## 2020-07-16 ENCOUNTER — CLINICAL SUPPORT (OUTPATIENT)
Dept: OPHTHALMOLOGY | Facility: CLINIC | Age: 55
End: 2020-07-16
Payer: COMMERCIAL

## 2020-07-16 DIAGNOSIS — H21.233 PIGMENT DISPERSION SYNDROME, BILATERAL: ICD-10-CM

## 2020-07-16 DIAGNOSIS — H40.053 OCULAR HYPERTENSION, BILATERAL: ICD-10-CM

## 2020-07-16 PROCEDURE — 92083 EXTENDED VISUAL FIELD XM: CPT | Mod: S$GLB,,, | Performed by: OPTOMETRIST

## 2020-07-16 PROCEDURE — 92020 PR SPECIAL EYE EVAL,GONIOSCOPY: ICD-10-PCS | Mod: S$GLB,,, | Performed by: OPTOMETRIST

## 2020-07-16 PROCEDURE — 92020 GONIOSCOPY: CPT | Mod: S$GLB,,, | Performed by: OPTOMETRIST

## 2020-07-16 PROCEDURE — 92133 CPTRZD OPH DX IMG PST SGM ON: CPT | Mod: S$GLB,,, | Performed by: OPTOMETRIST

## 2020-07-16 PROCEDURE — 92083 HUMPHREY VISUAL FIELD - OU - BOTH EYES: ICD-10-PCS | Mod: S$GLB,,, | Performed by: OPTOMETRIST

## 2020-07-16 PROCEDURE — 92014 COMPRE OPH EXAM EST PT 1/>: CPT | Mod: S$GLB,,, | Performed by: OPTOMETRIST

## 2020-07-16 PROCEDURE — 92133 POSTERIOR SEGMENT OCT OPTIC NERVE(OCULAR COHERENCE TOMOGRAPHY) - OU - BOTH EYES: ICD-10-PCS | Mod: S$GLB,,, | Performed by: OPTOMETRIST

## 2020-07-16 PROCEDURE — 92014 PR EYE EXAM, EST PATIENT,COMPREHESV: ICD-10-PCS | Mod: S$GLB,,, | Performed by: OPTOMETRIST

## 2020-07-16 PROCEDURE — 99999 PR PBB SHADOW E&M-EST. PATIENT-LVL III: ICD-10-PCS | Mod: PBBFAC,,, | Performed by: OPTOMETRIST

## 2020-07-16 PROCEDURE — 99999 PR PBB SHADOW E&M-EST. PATIENT-LVL III: CPT | Mod: PBBFAC,,, | Performed by: OPTOMETRIST

## 2020-07-16 RX ORDER — AZELASTINE HCL 205.5 UG/1
1 SPRAY NASAL
COMMUNITY
Start: 2019-09-10

## 2020-07-16 RX ORDER — FLUTICASONE FUROATE, UMECLIDINIUM BROMIDE AND VILANTEROL TRIFENATATE 100; 62.5; 25 UG/1; UG/1; UG/1
POWDER RESPIRATORY (INHALATION)
COMMUNITY
Start: 2020-06-27

## 2020-07-16 NOTE — PROGRESS NOTES
HPI     KACEY 12/2019  Here for HVF,OCT and IOP check today.  Using Xalatan OU Q   HS, last used last night.  Patient hasn't noticed any changes in vision   since last exam.  Wears OTC +1.50 readers, seems to work fine.  PDS/OHT    Last edited by Terence Wong, OD on 7/16/2020  9:18 AM. (History)        ROS     Positive for: Eyes (pig disp/OHT OU)    Negative for: Constitutional, Gastrointestinal, Neurological, Skin,   Genitourinary, Musculoskeletal, HENT, Endocrine, Cardiovascular,   Respiratory, Psychiatric, Allergic/Imm, Heme/Lymph    Last edited by Terence Wong, OD on 7/16/2020  9:18 AM. (History)        Assessment /Plan     For exam results, see Encounter Report.    Ocular hypertension, bilateral  -     Posterior Segment OCT Optic Nerve- Both eyes  -     Willingham Visual Field - OU - Extended - Both Eyes    Pigment dispersion syndrome, bilateral  -     Posterior Segment OCT Optic Nerve- Both eyes  -     Willingham Visual Field - OU - Extended - Both Eyes      1. Pig disp/OHT OU--iop wnl on XAL OU (was on milka ou/alpha OD in past). Mod cupping--see OCT (outside normal OD w red I/Yellow sup, borderline OS w yellow I. stable).   VF today  +/- wnl OU.   -fam hx. Pach:599/582.  Angles open by gonio  to SS with gr 3 pig on TM OU  2. Pt happy w otc readers when needed (refraction deferred today)  3. ROGERS--pt to cont w ATs          Plan:    1. Cont XAL qhs OU  2. rtc 6 mos--iop ck.  Next  HVF 24-2 sf/OCT/full July 2021

## 2020-07-16 NOTE — PROGRESS NOTES
hvf done/rel/fix/coop. Good ou/chart checked and asked patient regarding latex allergy./lid taped OS/Rx used: -.25 + .75 x 30 OD/ -1.00 + .50 x 135 OS- [facility]

## 2022-05-02 ENCOUNTER — TELEPHONE (OUTPATIENT)
Dept: OPTOMETRY | Facility: CLINIC | Age: 57
End: 2022-05-02
Payer: COMMERCIAL

## 2022-05-02 DIAGNOSIS — H40.053 OCULAR HYPERTENSION, BILATERAL: Primary | ICD-10-CM

## 2022-05-02 RX ORDER — LATANOPROST 50 UG/ML
SOLUTION/ DROPS OPHTHALMIC
Qty: 10 ML | Refills: 12 | Status: SHIPPED | OUTPATIENT
Start: 2022-05-02 | End: 2022-12-07 | Stop reason: SDUPTHER

## 2022-05-02 NOTE — TELEPHONE ENCOUNTER
----- Message from Katya Merrill MA sent at 5/2/2022 11:02 AM CDT -----  Regarding: FW: refill  Contact: pt    ----- Message -----  From: Mackenzie Vasquez  Sent: 5/2/2022  10:51 AM CDT  To: [facility] Staff  Subject: refill                                           Pt calling in regards to having eye drops for pressure refilled. Pt would like it sent to [facility] [address] [place] @ [phone]

## 2022-12-07 ENCOUNTER — TELEPHONE (OUTPATIENT)
Dept: OPTOMETRY | Facility: CLINIC | Age: 57
End: 2022-12-07
Payer: COMMERCIAL

## 2022-12-07 DIAGNOSIS — H40.053 OCULAR HYPERTENSION, BILATERAL: ICD-10-CM

## 2022-12-07 RX ORDER — LATANOPROST 50 UG/ML
SOLUTION/ DROPS OPHTHALMIC
Qty: 10 ML | Refills: 12 | Status: SHIPPED | OUTPATIENT
Start: 2022-12-07

## 2022-12-07 NOTE — TELEPHONE ENCOUNTER
----- Message from Renan Weber MA sent at 12/7/2022  9:42 AM CST -----  Regarding: FW: Refill Request  Scheduled pt ,, pt needs refill called in   ----- Message -----  From: Trinidad Del Real  Sent: 12/7/2022   8:41 AM CST  To: [facility] Staff  Subject: Refill Request                                   Pt called for a refill request prescription for latanoprost 0.005 % ophthalmic solution. Pt would also like to schedule a f/u appt.     Pts call back: [phone] (home)       [id number] - [address] [address] [facility] [address]  AT [facility] [address]  [address] [place] [address]  Phone: [phone] Fax: [phone]

## 2023-02-08 ENCOUNTER — CLINICAL SUPPORT (OUTPATIENT)
Dept: OPHTHALMOLOGY | Facility: CLINIC | Age: 58
End: 2023-02-08
Payer: COMMERCIAL

## 2023-02-08 ENCOUNTER — OFFICE VISIT (OUTPATIENT)
Dept: OPTOMETRY | Facility: CLINIC | Age: 58
End: 2023-02-08
Payer: COMMERCIAL

## 2023-02-08 DIAGNOSIS — H40.053 OCULAR HYPERTENSION, BILATERAL: Primary | ICD-10-CM

## 2023-02-08 PROCEDURE — 1159F MED LIST DOCD IN RCRD: CPT | Mod: CPTII,S$GLB,, | Performed by: OPTOMETRIST

## 2023-02-08 PROCEDURE — 1159F PR MEDICATION LIST DOCUMENTED IN MEDICAL RECORD: ICD-10-PCS | Mod: CPTII,S$GLB,, | Performed by: OPTOMETRIST

## 2023-02-08 PROCEDURE — 92133 POSTERIOR SEGMENT OCT OPTIC NERVE(OCULAR COHERENCE TOMOGRAPHY) - OU - BOTH EYES: ICD-10-PCS | Mod: S$GLB,,, | Performed by: OPTOMETRIST

## 2023-02-08 PROCEDURE — 92083 HUMPHREY VISUAL FIELD - OU - BOTH EYES: ICD-10-PCS | Mod: S$GLB,,, | Performed by: OPTOMETRIST

## 2023-02-08 PROCEDURE — 92014 PR EYE EXAM, EST PATIENT,COMPREHESV: ICD-10-PCS | Mod: S$GLB,,, | Performed by: OPTOMETRIST

## 2023-02-08 PROCEDURE — 92133 CPTRZD OPH DX IMG PST SGM ON: CPT | Mod: S$GLB,,, | Performed by: OPTOMETRIST

## 2023-02-08 PROCEDURE — 92014 COMPRE OPH EXAM EST PT 1/>: CPT | Mod: S$GLB,,, | Performed by: OPTOMETRIST

## 2023-02-08 PROCEDURE — 92020 PR SPECIAL EYE EVAL,GONIOSCOPY: ICD-10-PCS | Mod: S$GLB,,, | Performed by: OPTOMETRIST

## 2023-02-08 PROCEDURE — 1160F PR REVIEW ALL MEDS BY PRESCRIBER/CLIN PHARMACIST DOCUMENTED: ICD-10-PCS | Mod: CPTII,S$GLB,, | Performed by: OPTOMETRIST

## 2023-02-08 PROCEDURE — 1160F RVW MEDS BY RX/DR IN RCRD: CPT | Mod: CPTII,S$GLB,, | Performed by: OPTOMETRIST

## 2023-02-08 PROCEDURE — 92020 GONIOSCOPY: CPT | Mod: S$GLB,,, | Performed by: OPTOMETRIST

## 2023-02-08 PROCEDURE — 99999 PR PBB SHADOW E&M-EST. PATIENT-LVL III: ICD-10-PCS | Mod: PBBFAC,,, | Performed by: OPTOMETRIST

## 2023-02-08 PROCEDURE — 92083 EXTENDED VISUAL FIELD XM: CPT | Mod: S$GLB,,, | Performed by: OPTOMETRIST

## 2023-02-08 PROCEDURE — 99999 PR PBB SHADOW E&M-EST. PATIENT-LVL III: CPT | Mod: PBBFAC,,, | Performed by: OPTOMETRIST

## 2023-02-08 NOTE — PROGRESS NOTES
HPI    Pt sts here for routine eye exam. No flashes no floaters no blurred   vision.    Pig disp syndrome/OHT OU    Using LATANOPROST qhs OU  Last edited by Terence Wong, OD on 2/8/2023  1:02 PM.        ROS    Positive for: Eyes (pig disp/OHT OU)  Negative for: Constitutional, Gastrointestinal, Neurological, Skin,   Genitourinary, Musculoskeletal, HENT, Endocrine, Cardiovascular,   Respiratory, Psychiatric, Allergic/Imm, Heme/Lymph  Last edited by Terence Wong, OD on 2/8/2023 12:55 PM.        Assessment /Plan     For exam results, see Encounter Report.    Ocular hypertension, bilateral  -     Willingham Visual Field - OU - Extended - Both Eyes  -     Posterior Segment OCT Optic Nerve- Both eyes          1. Pig disp/OHT OU--iop wnl on XAL OU (was on milka ou/alpha OD in past). Mod cupping--see OCT (outside normal OD w red SI, normal OS.  MRWA outside normal OD/borderline OS).   VF today  +/- wnl OU.   -fam hx. Pach:599/582.  Angles open by gonio  to SS with gr 3 pig on TM OU  2. Pt happy w otc readers when needed (refraction deferred today)  3. ROGERS--pt to cont w ATs          Plan:    1. Cont XAL qhs OU  2. rtc 6 mos--iop ck.  Next  HVF 24-2 sf/OCT/full Feb 2024